# Patient Record
Sex: FEMALE | Race: WHITE | NOT HISPANIC OR LATINO | URBAN - METROPOLITAN AREA
[De-identification: names, ages, dates, MRNs, and addresses within clinical notes are randomized per-mention and may not be internally consistent; named-entity substitution may affect disease eponyms.]

---

## 2018-05-18 ENCOUNTER — EMERGENCY (EMERGENCY)
Facility: HOSPITAL | Age: 28
LOS: 0 days | Discharge: HOME | End: 2018-05-18
Attending: EMERGENCY MEDICINE | Admitting: EMERGENCY MEDICINE

## 2018-05-18 VITALS
RESPIRATION RATE: 19 BRPM | SYSTOLIC BLOOD PRESSURE: 122 MMHG | OXYGEN SATURATION: 99 % | HEART RATE: 92 BPM | DIASTOLIC BLOOD PRESSURE: 78 MMHG

## 2018-05-18 VITALS
HEIGHT: 69 IN | RESPIRATION RATE: 18 BRPM | HEART RATE: 89 BPM | WEIGHT: 214.95 LBS | TEMPERATURE: 97 F | SYSTOLIC BLOOD PRESSURE: 145 MMHG | DIASTOLIC BLOOD PRESSURE: 94 MMHG | OXYGEN SATURATION: 97 %

## 2018-05-18 DIAGNOSIS — Z98.890 OTHER SPECIFIED POSTPROCEDURAL STATES: Chronic | ICD-10-CM

## 2018-05-18 DIAGNOSIS — R07.89 OTHER CHEST PAIN: ICD-10-CM

## 2018-05-18 DIAGNOSIS — Z87.891 PERSONAL HISTORY OF NICOTINE DEPENDENCE: ICD-10-CM

## 2018-05-18 RX ORDER — SODIUM CHLORIDE 9 MG/ML
3 INJECTION INTRAMUSCULAR; INTRAVENOUS; SUBCUTANEOUS ONCE
Qty: 0 | Refills: 0 | Status: DISCONTINUED | OUTPATIENT
Start: 2018-05-18 | End: 2018-05-18

## 2018-05-18 NOTE — ED PROVIDER NOTE - PHYSICAL EXAMINATION
VITAL SIGNS: I have reviewed the initial vital signs.   CONSTITUTIONAL: Awake, alert. Well-developed; well-nourished; in no distress. Non-toxic appearing.   SKIN: No rash, vesicles/lesion, abrasions or lacerations. No ecchymosis or signs of trauma. Cap refill < 2 secs.   HEAD: Normocephalic; atraumatic. No step offs or tenderness. No Marrero Sign’s or Raccoon eyes.   CARD: No chest wall deformity + reproducible chest wall ttp over right side. S1, S2 normal; no murmurs, gallops, or rubs. Regular rate and rhythm.  RESP: Good air movement. Lungs CTAB. No crackles, wheezes, rales or rhonchi.  ABD: Soft; non-distended; non-tender.   EXT: No bony deformity or tenderness. Normal ROM x 4 extremities. No LE edema. No calf tenderness/edema/erythema. Negative Rhys's Sign.

## 2018-05-18 NOTE — ED PROVIDER NOTE - MEDICAL DECISION MAKING DETAILS
*Age <50 years•Heart rate <100 beats/minute•Oxyhemoglobin saturation =95 percent*No hemoptysis•No  estrogen use  •No prior DVT or PE•No unilateral leg swelling•No surgery/trauma requiring hospitalization within the prior four weeks

## 2018-05-18 NOTE — ED PROVIDER NOTE - OBJECTIVE STATEMENT
Pt is a 26 y/o Female, no PMHX, former smoker, presents to ED for right sided chest pain. Pt reports symptoms began last night, but because she was asleep, did not think much of it and awoke this morning with pain worse with inspiration and exertion. Pt denies previous episode. Denies fever/chills, cough, hemoptysis, URI symptoms, abdominal pain, n/v/d, back pain, numbness, tingling, weakness, cardiac hx/sx, hx of DVT/PE, recent sx, recent travel, OCP/hormonal replacement use, trauma/injury. Reports father had cardiac stents place, but denies MI.

## 2018-05-18 NOTE — ED PROVIDER NOTE - PROGRESS NOTE DETAILS
Pt is PERC neg. Results discussed with pt. Instructed pt to f/u with PMD and take NSAIDs for pain relief. Return precautions discussed at length and verbalized agreement. Pt ok with plan and comfortable with discharge.

## 2018-05-18 NOTE — ED PROVIDER NOTE - NS ED ROS FT
Except as documented in HPI, all other ROS negative.   GENERAL: Denies fever/chills, loss of appetite/weight or fatigue.  SKIN: Denies rashes, abrasions, lacerations, ecchymosis, erythema, or edema.  HEAD: Denies headache, dizziness or trauma.  CARDIAC: + chest pain.   RESPIRATORY: Denies SOB, cough, hemoptysis or wheezing.   GI: Denies abdominal pain, n/v/d.   MSK: Denies myalgias, bony deformity or pain.   NEURO: Denies paresthesias, tingling or weakness.

## 2023-10-03 NOTE — ED ADULT NURSE NOTE - CHIEF COMPLAINT
Due to negative antibody Graves   Continue methimazole 5 mg daily   Check TSH fT4    Consider increase to 7.5 mg daily depending on her level as she is having symptoms versus addition of metoprolol    US unremarkable    NM scan in 2022 showed no nodules   Check bone density    The patient is a 27y Female complaining of chest pain.